# Patient Record
Sex: MALE | Race: WHITE | NOT HISPANIC OR LATINO | Employment: FULL TIME | URBAN - METROPOLITAN AREA
[De-identification: names, ages, dates, MRNs, and addresses within clinical notes are randomized per-mention and may not be internally consistent; named-entity substitution may affect disease eponyms.]

---

## 2023-10-14 ENCOUNTER — LAB (OUTPATIENT)
Dept: LAB | Facility: LAB | Age: 68
End: 2023-10-14
Payer: MEDICARE

## 2023-10-14 DIAGNOSIS — I25.10 ATHEROSCLEROTIC HEART DISEASE OF NATIVE CORONARY ARTERY WITHOUT ANGINA PECTORIS: Primary | ICD-10-CM

## 2023-10-14 LAB
ALBUMIN SERPL BCP-MCNC: 4.4 G/DL (ref 3.4–5)
ALP SERPL-CCNC: 80 U/L (ref 33–136)
ALT SERPL W P-5'-P-CCNC: 22 U/L (ref 10–52)
ANION GAP SERPL CALC-SCNC: 13 MMOL/L (ref 10–20)
AST SERPL W P-5'-P-CCNC: 20 U/L (ref 9–39)
BILIRUB SERPL-MCNC: 1.8 MG/DL (ref 0–1.2)
BUN SERPL-MCNC: 16 MG/DL (ref 6–23)
CALCIUM SERPL-MCNC: 9.2 MG/DL (ref 8.6–10.6)
CHLORIDE SERPL-SCNC: 103 MMOL/L (ref 98–107)
CHOLEST SERPL-MCNC: 155 MG/DL (ref 0–199)
CHOLESTEROL/HDL RATIO: 3.1
CO2 SERPL-SCNC: 26 MMOL/L (ref 21–32)
CREAT SERPL-MCNC: 0.88 MG/DL (ref 0.5–1.3)
ERYTHROCYTE [DISTWIDTH] IN BLOOD BY AUTOMATED COUNT: 12.6 % (ref 11.5–14.5)
GFR SERPL CREATININE-BSD FRML MDRD: >90 ML/MIN/1.73M*2
GLUCOSE SERPL-MCNC: 107 MG/DL (ref 74–99)
HCT VFR BLD AUTO: 46.8 % (ref 41–52)
HDLC SERPL-MCNC: 49.4 MG/DL
HGB BLD-MCNC: 15.6 G/DL (ref 13.5–17.5)
LDLC SERPL CALC-MCNC: 68 MG/DL
MCH RBC QN AUTO: 29.1 PG (ref 26–34)
MCHC RBC AUTO-ENTMCNC: 33.3 G/DL (ref 32–36)
MCV RBC AUTO: 87 FL (ref 80–100)
NON HDL CHOLESTEROL: 106 MG/DL (ref 0–149)
NRBC BLD-RTO: 0 /100 WBCS (ref 0–0)
PLATELET # BLD AUTO: 256 X10*3/UL (ref 150–450)
PMV BLD AUTO: 9.8 FL (ref 7.5–11.5)
POTASSIUM SERPL-SCNC: 4.1 MMOL/L (ref 3.5–5.3)
PROT SERPL-MCNC: 6.9 G/DL (ref 6.4–8.2)
RBC # BLD AUTO: 5.37 X10*6/UL (ref 4.5–5.9)
SODIUM SERPL-SCNC: 138 MMOL/L (ref 136–145)
TRIGL SERPL-MCNC: 188 MG/DL (ref 0–149)
VLDL: 38 MG/DL (ref 0–40)
WBC # BLD AUTO: 6.1 X10*3/UL (ref 4.4–11.3)

## 2023-10-14 PROCEDURE — 80061 LIPID PANEL: CPT

## 2023-10-14 PROCEDURE — 85027 COMPLETE CBC AUTOMATED: CPT

## 2023-10-14 PROCEDURE — 80053 COMPREHEN METABOLIC PANEL: CPT

## 2023-10-14 PROCEDURE — 36415 COLL VENOUS BLD VENIPUNCTURE: CPT

## 2023-10-16 ENCOUNTER — TELEPHONE (OUTPATIENT)
Dept: CARDIOLOGY | Facility: CLINIC | Age: 68
End: 2023-10-16
Payer: MEDICARE

## 2023-10-16 NOTE — TELEPHONE ENCOUNTER
----- Message from Cl Jones MD sent at 10/16/2023 12:41 PM EDT -----  Regarding: labs  Notify pt labs were good including cholesterol much better

## 2023-12-07 DIAGNOSIS — I10 PRIMARY HYPERTENSION: Primary | ICD-10-CM

## 2023-12-07 RX ORDER — AMLODIPINE BESYLATE 5 MG/1
5 TABLET ORAL DAILY
Qty: 90 TABLET | Refills: 0 | Status: SHIPPED | OUTPATIENT
Start: 2023-12-07 | End: 2024-03-06

## 2024-02-16 ENCOUNTER — OFFICE VISIT (OUTPATIENT)
Dept: PRIMARY CARE | Facility: CLINIC | Age: 69
End: 2024-02-16
Payer: MEDICARE

## 2024-02-16 DIAGNOSIS — J34.89 RHINORRHEA: ICD-10-CM

## 2024-02-16 DIAGNOSIS — R09.81 NASAL CONGESTION: Primary | ICD-10-CM

## 2024-02-16 DIAGNOSIS — I10 PRIMARY HYPERTENSION: ICD-10-CM

## 2024-02-16 DIAGNOSIS — E78.5 HYPERLIPIDEMIA, UNSPECIFIED HYPERLIPIDEMIA TYPE: ICD-10-CM

## 2024-02-16 PROCEDURE — 3075F SYST BP GE 130 - 139MM HG: CPT | Performed by: INTERNAL MEDICINE

## 2024-02-16 PROCEDURE — 3078F DIAST BP <80 MM HG: CPT | Performed by: INTERNAL MEDICINE

## 2024-02-16 PROCEDURE — 1126F AMNT PAIN NOTED NONE PRSNT: CPT | Performed by: INTERNAL MEDICINE

## 2024-02-16 PROCEDURE — 99214 OFFICE O/P EST MOD 30 MIN: CPT | Performed by: INTERNAL MEDICINE

## 2024-02-16 RX ORDER — TRIAMCINOLONE ACETONIDE 55 UG/1
2 SPRAY, METERED NASAL DAILY
Qty: 16.5 G | Refills: 11 | Status: SHIPPED | OUTPATIENT
Start: 2024-02-16 | End: 2025-02-15

## 2024-02-16 RX ORDER — CETIRIZINE HYDROCHLORIDE 10 MG/1
10 TABLET ORAL DAILY
Qty: 30 TABLET | Refills: 11 | Status: SHIPPED | OUTPATIENT
Start: 2024-02-16 | End: 2025-02-15

## 2024-02-16 NOTE — PROGRESS NOTES
"Subjective   Patient ID: Pete Zuniga Dds \"CONSTANTINO" is a 68 y.o. male who presents for Follow-up.  HPI  Cough AM phlegm 2 months nasal congestion sudafed   Clears and normal daily shower help  Voice raspy - fever     Review of Systems   Constitutional:  Negative for fever.   HENT:  Negative for dental problem, nosebleeds and sore throat.    Respiratory:  Positive for cough. Negative for chest tightness, shortness of breath and wheezing.    Cardiovascular: Negative.    Gastrointestinal: Negative.  Negative for nausea and vomiting.        No dysphagia       Objective   Physical Exam  Constitutional:       General: He is not in acute distress.     Appearance: Normal appearance.   HENT:      Nose: Congestion present.      Mouth/Throat:      Mouth: Mucous membranes are moist.      Pharynx: No oropharyngeal exudate or posterior oropharyngeal erythema.   Neck:      Comments: No JVD or thyromegaly  Cardiovascular:      Rate and Rhythm: Normal rate and regular rhythm.      Pulses: Normal pulses.      Heart sounds: Normal heart sounds.   Pulmonary:      Effort: Pulmonary effort is normal.      Breath sounds: Normal breath sounds. No wheezing.   Abdominal:      General: Abdomen is flat. Bowel sounds are normal.      Palpations: Abdomen is soft.      Tenderness: There is no abdominal tenderness.   Musculoskeletal:      Cervical back: Normal range of motion and neck supple.      Right lower leg: No edema.      Left lower leg: No edema.   Lymphadenopathy:      Cervical: No cervical adenopathy.   Neurological:      General: No focal deficit present.      Mental Status: He is alert.   Psychiatric:         Mood and Affect: Mood normal.       /74   Pulse 72   Resp 14     Data  Echocardiogram 8/18/2023 -normal  Lab 10/14/2023 reviewed-normal  Cardiology consult 8/18/2023 reviewed    Assessment/Plan     Nasal congestion and rhinorrhea  Reviewed symptomatic relief for both short-term and long-term  Trial antihistamine at night " to enhance sleep and relieve morning mucus  Use topical cortisone nasal spray for 2-3 weeks to clear nasal passages    Hypertension, hyperlipidemia and coronary artery disease stable  Problem List Items Addressed This Visit       Nasal congestion - Primary    Relevant Medications    cetirizine (ZyrTEC) 10 mg tablet    triamcinolone (Nasacort) 55 mcg nasal inhaler    Hyperlipidemia    Primary hypertension    Rhinorrhea    Relevant Medications    triamcinolone (Nasacort) 55 mcg nasal inhaler

## 2024-02-16 NOTE — PATIENT INSTRUCTIONS
Nasal mucus and congestion in morning  Try Zyrtec at bedtime for the next day relief  Nasacort daily for 2 weeks to reduce inflammation and clear the nasal passages    Zyrtec may help the arm rash    Keep in mind RSV and pneumonia vaccines

## 2024-03-06 DIAGNOSIS — I10 PRIMARY HYPERTENSION: ICD-10-CM

## 2024-03-06 RX ORDER — AMLODIPINE BESYLATE 5 MG/1
5 TABLET ORAL DAILY
Qty: 90 TABLET | Refills: 3 | Status: SHIPPED | OUTPATIENT
Start: 2024-03-06

## 2024-03-11 VITALS — SYSTOLIC BLOOD PRESSURE: 130 MMHG | HEART RATE: 72 BPM | DIASTOLIC BLOOD PRESSURE: 74 MMHG | RESPIRATION RATE: 14 BRPM

## 2024-03-11 PROBLEM — R09.81 NASAL CONGESTION: Status: ACTIVE | Noted: 2024-03-11

## 2024-03-11 PROBLEM — J34.89 RHINORRHEA: Status: ACTIVE | Noted: 2024-03-11

## 2024-03-11 PROBLEM — I10 PRIMARY HYPERTENSION: Status: ACTIVE | Noted: 2024-03-11

## 2024-03-11 PROBLEM — E78.5 HYPERLIPIDEMIA: Status: ACTIVE | Noted: 2024-03-11

## 2024-03-11 ASSESSMENT — ENCOUNTER SYMPTOMS
FEVER: 0
CARDIOVASCULAR NEGATIVE: 1
NAUSEA: 0
GASTROINTESTINAL NEGATIVE: 1
CHEST TIGHTNESS: 0
SORE THROAT: 0
WHEEZING: 0
COUGH: 1
ROS GI COMMENTS: NO DYSPHAGIA
VOMITING: 0
SHORTNESS OF BREATH: 0

## 2024-08-07 ENCOUNTER — TELEPHONE (OUTPATIENT)
Dept: PRIMARY CARE | Facility: CLINIC | Age: 69
End: 2024-08-07
Payer: MEDICARE

## 2024-08-07 DIAGNOSIS — U07.1 COVID-19 VIRUS INFECTION: Primary | ICD-10-CM

## 2024-08-07 NOTE — TELEPHONE ENCOUNTER
Patient tested positive Covid yesterday. He would like Paxlovid called into [facility] pharmacy. No shortness of breath as well.

## 2024-08-16 ENCOUNTER — APPOINTMENT (OUTPATIENT)
Dept: CARDIOLOGY | Facility: CLINIC | Age: 69
End: 2024-08-16
Payer: MEDICARE

## 2024-09-06 ENCOUNTER — OFFICE VISIT (OUTPATIENT)
Dept: CARDIOLOGY | Facility: CLINIC | Age: 69
End: 2024-09-06
Payer: MEDICARE

## 2024-09-06 VITALS
WEIGHT: 218.25 LBS | DIASTOLIC BLOOD PRESSURE: 70 MMHG | HEIGHT: 73 IN | SYSTOLIC BLOOD PRESSURE: 132 MMHG | BODY MASS INDEX: 28.93 KG/M2 | OXYGEN SATURATION: 95 % | HEART RATE: 74 BPM

## 2024-09-06 DIAGNOSIS — I25.10 CORONARY ARTERY DISEASE INVOLVING NATIVE CORONARY ARTERY OF NATIVE HEART WITHOUT ANGINA PECTORIS: Primary | ICD-10-CM

## 2024-09-06 PROCEDURE — 3075F SYST BP GE 130 - 139MM HG: CPT | Performed by: INTERNAL MEDICINE

## 2024-09-06 PROCEDURE — 3078F DIAST BP <80 MM HG: CPT | Performed by: INTERNAL MEDICINE

## 2024-09-06 PROCEDURE — 3008F BODY MASS INDEX DOCD: CPT | Performed by: INTERNAL MEDICINE

## 2024-09-06 PROCEDURE — 1159F MED LIST DOCD IN RCRD: CPT | Performed by: INTERNAL MEDICINE

## 2024-09-06 PROCEDURE — 99213 OFFICE O/P EST LOW 20 MIN: CPT | Performed by: INTERNAL MEDICINE

## 2024-09-06 PROCEDURE — 1036F TOBACCO NON-USER: CPT | Performed by: INTERNAL MEDICINE

## 2024-09-06 PROCEDURE — 1126F AMNT PAIN NOTED NONE PRSNT: CPT | Performed by: INTERNAL MEDICINE

## 2024-09-06 ASSESSMENT — ENCOUNTER SYMPTOMS
NAUSEA: 0
LOSS OF SENSATION IN FEET: 0
CHILLS: 0
ALTERED MENTAL STATUS: 0
VOMITING: 0
DEPRESSION: 0
FEVER: 0
WHEEZING: 0
CONSTIPATION: 0
COUGH: 0
OCCASIONAL FEELINGS OF UNSTEADINESS: 0
MEMORY LOSS: 0
ABDOMINAL PAIN: 0
DIARRHEA: 0
HEADACHES: 0
HEMOPTYSIS: 0
HEMATURIA: 0
MYALGIAS: 0
FALLS: 0
BLOATING: 0
DYSURIA: 0

## 2024-09-06 ASSESSMENT — PATIENT HEALTH QUESTIONNAIRE - PHQ9
2. FEELING DOWN, DEPRESSED OR HOPELESS: NOT AT ALL
SUM OF ALL RESPONSES TO PHQ9 QUESTIONS 1 AND 2: 0
1. LITTLE INTEREST OR PLEASURE IN DOING THINGS: NOT AT ALL

## 2024-09-06 ASSESSMENT — COLUMBIA-SUICIDE SEVERITY RATING SCALE - C-SSRS
1. IN THE PAST MONTH, HAVE YOU WISHED YOU WERE DEAD OR WISHED YOU COULD GO TO SLEEP AND NOT WAKE UP?: NO
6. HAVE YOU EVER DONE ANYTHING, STARTED TO DO ANYTHING, OR PREPARED TO DO ANYTHING TO END YOUR LIFE?: NO
2. HAVE YOU ACTUALLY HAD ANY THOUGHTS OF KILLING YOURSELF?: NO

## 2024-09-06 ASSESSMENT — PAIN SCALES - GENERAL: PAINLEVEL: 0-NO PAIN

## 2024-09-06 NOTE — PATIENT INSTRUCTIONS
If swelling is worse/bothers you more, we can try changing Amlodipine to Losartan  [phone] or mychart

## 2024-09-06 NOTE — PROGRESS NOTES
Chief Complaint   Patient presents with    Follow-up    Hyperlipidemia    Hypertension    Coronary Artery Disease      HPI  70 yo WM w/ h/o CAD, HTN, HLD now here for cardiology f/u. He continues to feel good. No chest pain. No dyspnea. No palps. No LH/dizziness/syncope. +mild dep LE edema on Amlodipine (doesn't bother him). No claudication. No cough.   ECG 8/16: SR (65)  ECG 4/21: SR (64)  ECG 8/21: SR (78), normal  Echo 8/23: EF 65%  ROGER 11/16: no CP, 2.5mm ST dep, no WMA, EF 60% -> 75%  ROGER 2/21: no CP 2.5mm ST dep, echo TDS (on my view, EF appears hyperdynamic at rest and stress; borderline DD; no sig pulm HTN)  CCS 9/16: 522  CTA cors 7/21: no LM (separate ostia), oLAD <25%, p-mLAD 50-70%, mLAD 50-70%, dLAD 25-50%, mD2 >70%, mLCx 50-70%, dRCA 25-50%, mod athero of Ao, no aneurysm, mild-mod AV calc     Review of Systems   Constitutional: Negative for chills, fever and malaise/fatigue.   HENT:  Negative for hearing loss.    Eyes:  Negative for visual disturbance.   Respiratory:  Negative for cough, hemoptysis and wheezing.    Skin:  Negative for rash.   Musculoskeletal:  Negative for falls and myalgias.   Gastrointestinal:  Negative for bloating, abdominal pain, constipation, diarrhea, dysphagia, nausea and vomiting.   Genitourinary:  Negative for dysuria and hematuria.   Neurological:  Negative for headaches.   Psychiatric/Behavioral:  Negative for altered mental status, depression and memory loss.       Social History     Tobacco Use   Smoking Status Never   Smokeless Tobacco Never       Family History   Problem Relation Name Age of Onset    Coronary artery disease Father       No Known Allergies     Current Outpatient Medications   Medication Instructions    amLODIPine (NORVASC) 5 mg, oral, Daily    cetirizine (ZYRTEC) 10 mg, oral, Daily    triamcinolone (Nasacort) 55 mcg nasal inhaler 2 sprays, Each Nostril, Daily      Vitals:    09/06/24 1545   BP: 132/70   Pulse: 74   SpO2: 95%      Physical  Exam  Constitutional:       Appearance: Normal appearance.   HENT:      Head: Normocephalic and atraumatic.      Nose: Nose normal.   Neck:      Vascular: No carotid bruit.   Cardiovascular:      Rate and Rhythm: Normal rate and regular rhythm.      Heart sounds: No murmur heard.  Pulmonary:      Effort: Pulmonary effort is normal.      Breath sounds: Normal breath sounds.   Abdominal:      Palpations: Abdomen is soft.      Tenderness: There is no abdominal tenderness.   Musculoskeletal:      Right lower le+ Pitting Edema present.      Left lower le+ Pitting Edema present.   Skin:     General: Skin is warm and dry.   Neurological:      General: No focal deficit present.      Mental Status: He is alert.   Psychiatric:         Mood and Affect: Mood normal.         Judgment: Judgment normal.        Results/Data  10/23 Cr 0.88, K 4.1, LDL 68, HDL 49, , Chol 155, HGB 15.6,    Cr 0.93, K 4.6, LFT nl, , HDL 55, , Chol 194, HGB 15.8, , BNP 8 (occ Rosuva 40)   Cr 0.8   Cr 1.11, K 4.6, LDL 85, HDL 44, , Chol 182, HGB 15.3, , hgba1c 5.8, TSH 3.85 (Rosuva 20)  10/17 , HDL 51, , Chol 209     Assessment/Plan   68 yo WM w/ h/o CAD, HTN, HLD. Doing well. No cardiac symptoms other than ch LE edema likely SE from Amlodipine (which he prefers to not change). Continue medical management. If symptoms, plan cath.  -continue ASA 81 qd (with food)  -continue Amlodipine 5 qd (mild edema doesn't bother him; if does, can change to Losartan or Chlorthalidone)  -continue Rosuva 40 qhs (take daily) -> goal LDL <70  -f/u 1 year (earlier if needed)     Cl Jones MD

## 2025-03-05 DIAGNOSIS — I10 PRIMARY HYPERTENSION: ICD-10-CM

## 2025-03-06 RX ORDER — AMLODIPINE BESYLATE 5 MG/1
5 TABLET ORAL DAILY
Qty: 90 TABLET | Refills: 3 | Status: SHIPPED | OUTPATIENT
Start: 2025-03-06

## 2025-04-04 ENCOUNTER — APPOINTMENT (OUTPATIENT)
Dept: PRIMARY CARE | Facility: CLINIC | Age: 70
End: 2025-04-04
Payer: MEDICARE

## 2025-04-11 ENCOUNTER — APPOINTMENT (OUTPATIENT)
Dept: PRIMARY CARE | Facility: CLINIC | Age: 70
End: 2025-04-11
Payer: MEDICARE

## 2025-04-11 VITALS
DIASTOLIC BLOOD PRESSURE: 80 MMHG | HEIGHT: 73 IN | HEART RATE: 72 BPM | BODY MASS INDEX: 28.79 KG/M2 | SYSTOLIC BLOOD PRESSURE: 142 MMHG | OXYGEN SATURATION: 94 %

## 2025-04-11 DIAGNOSIS — Z12.5 SCREENING FOR PROSTATE CANCER: ICD-10-CM

## 2025-04-11 DIAGNOSIS — I25.10 CORONARY ARTERY DISEASE INVOLVING NATIVE CORONARY ARTERY OF NATIVE HEART WITHOUT ANGINA PECTORIS: ICD-10-CM

## 2025-04-11 DIAGNOSIS — E78.5 HYPERLIPIDEMIA, UNSPECIFIED HYPERLIPIDEMIA TYPE: ICD-10-CM

## 2025-04-11 DIAGNOSIS — Z00.00 ROUTINE GENERAL MEDICAL EXAMINATION AT A HEALTH CARE FACILITY: Primary | ICD-10-CM

## 2025-04-11 DIAGNOSIS — Z11.59 ENCOUNTER FOR HEPATITIS C SCREENING TEST FOR LOW RISK PATIENT: ICD-10-CM

## 2025-04-11 DIAGNOSIS — R73.01 IFG (IMPAIRED FASTING GLUCOSE): ICD-10-CM

## 2025-04-11 DIAGNOSIS — I10 PRIMARY HYPERTENSION: ICD-10-CM

## 2025-04-11 PROCEDURE — 3077F SYST BP >= 140 MM HG: CPT | Performed by: INTERNAL MEDICINE

## 2025-04-11 PROCEDURE — 1159F MED LIST DOCD IN RCRD: CPT | Performed by: INTERNAL MEDICINE

## 2025-04-11 PROCEDURE — 99214 OFFICE O/P EST MOD 30 MIN: CPT | Performed by: INTERNAL MEDICINE

## 2025-04-11 PROCEDURE — 3078F DIAST BP <80 MM HG: CPT | Performed by: INTERNAL MEDICINE

## 2025-04-11 ASSESSMENT — ENCOUNTER SYMPTOMS
DEPRESSION: 0
LOSS OF SENSATION IN FEET: 0
OCCASIONAL FEELINGS OF UNSTEADINESS: 0

## 2025-04-20 ASSESSMENT — ENCOUNTER SYMPTOMS
CONSTITUTIONAL NEGATIVE: 1
CARDIOVASCULAR NEGATIVE: 1
NEUROLOGICAL NEGATIVE: 1
GASTROINTESTINAL NEGATIVE: 1
RESPIRATORY NEGATIVE: 1

## 2025-04-20 NOTE — PROGRESS NOTES
"Subjective   Patient ID: Pete Zuniga Dds \"CONSTANTINO" is a 69 y.o. male who presents for Follow-up.  HPI  No acute complaints  No cardiovascular pulmonary or neurologic symptoms  Immunizations reviewed  [facility] visit 1/8/2021    Review of Systems   Constitutional: Negative.    HENT: Negative.     Respiratory: Negative.     Cardiovascular: Negative.    Gastrointestinal: Negative.    Neurological: Negative.        Objective   Physical Exam  Constitutional:       General: He is not in acute distress.  HENT:      Mouth/Throat:      Pharynx: Oropharynx is clear.   Cardiovascular:      Rate and Rhythm: Normal rate and regular rhythm.      Pulses: Normal pulses.      Heart sounds: Normal heart sounds.   Pulmonary:      Effort: Pulmonary effort is normal.      Breath sounds: Normal breath sounds.   Abdominal:      General: Bowel sounds are normal.      Palpations: Abdomen is soft. There is no mass.      Tenderness: There is no abdominal tenderness.   Neurological:      General: No focal deficit present.      Mental Status: He is alert.      Gait: Gait normal.   Psychiatric:         Mood and Affect: Mood normal.       /80 (BP Location: Right arm, Patient Position: Sitting)   Pulse 72   Ht 1.854 m (6' 1\")   SpO2 94%   BMI 28.79 kg/m²   BP Readings from Last 5 Encounters:   04/11/25 142/80   09/06/24 132/70   02/16/24 130/74   08/19/22 128/72   02/25/22 136/80      Data  Lab 10/14/2023 lipid, CMP, CBC satisfactory  1/5/2021 PSA 0.4  Cardiology consult 8/18/2023 stable on medical therapy  Ophthalmology 11/9/2023    Assessment/Plan     Overall doing well  Coronary artery disease asymptomatic and stable on medical therapy, risk factor modification  General health maintenance measures reviewed  Immunizations reviewed  Problem List Items Addressed This Visit       Hyperlipidemia    Relevant Orders    Renal Function Panel    Lipid Panel    Alanine Aminotransferase    Aspartate Aminotransferase    Hemoglobin A1C    Primary " hypertension    Relevant Orders    Renal Function Panel    Hemoglobin A1C    CBC and Auto Differential    Urinalysis with Reflex Microscopic    Coronary artery disease involving native coronary artery of native heart without angina pectoris    Relevant Orders    Renal Function Panel    CBC and Auto Differential    Encounter for hepatitis C screening test for low risk patient    Relevant Orders    Hepatitis C Antibody    Routine general medical examination at a health care facility - Primary    Relevant Orders    Prostate Specific Antigen, Screen    Hepatitis C Antibody    Renal Function Panel    Lipid Panel    Alanine Aminotransferase    Aspartate Aminotransferase    Hemoglobin A1C    TSH with reflex to Free T4 if abnormal    CBC and Auto Differential    Urinalysis with Reflex Microscopic    IFG (impaired fasting glucose)    Relevant Orders    Hemoglobin A1C

## 2025-05-04 LAB
ALBUMIN SERPL-MCNC: 4.4 G/DL (ref 3.6–5.1)
ALT SERPL-CCNC: 20 U/L (ref 9–46)
APPEARANCE UR: CLEAR
AST SERPL-CCNC: 18 U/L (ref 10–35)
BASOPHILS # BLD AUTO: 78 CELLS/UL (ref 0–200)
BASOPHILS NFR BLD AUTO: 1.4 %
BILIRUB UR QL STRIP: NEGATIVE
BUN SERPL-MCNC: 16 MG/DL (ref 7–25)
BUN/CREAT SERPL: ABNORMAL (CALC) (ref 6–22)
CALCIUM SERPL-MCNC: 8.9 MG/DL (ref 8.6–10.3)
CHLORIDE SERPL-SCNC: 105 MMOL/L (ref 98–110)
CHOLEST SERPL-MCNC: 173 MG/DL
CHOLEST/HDLC SERPL: 3.3 (CALC)
CO2 SERPL-SCNC: 28 MMOL/L (ref 20–32)
COLOR UR: YELLOW
CREAT SERPL-MCNC: 0.93 MG/DL (ref 0.7–1.35)
EGFRCR SERPLBLD CKD-EPI 2021: 89 ML/MIN/1.73M2
EOSINOPHIL # BLD AUTO: 263 CELLS/UL (ref 15–500)
EOSINOPHIL NFR BLD AUTO: 4.7 %
ERYTHROCYTE [DISTWIDTH] IN BLOOD BY AUTOMATED COUNT: 13 % (ref 11–15)
EST. AVERAGE GLUCOSE BLD GHB EST-MCNC: 126 MG/DL
EST. AVERAGE GLUCOSE BLD GHB EST-SCNC: 7 MMOL/L
GLUCOSE SERPL-MCNC: 105 MG/DL (ref 65–99)
GLUCOSE UR QL STRIP: NEGATIVE
HBA1C MFR BLD: 6 %
HCT VFR BLD AUTO: 46.7 % (ref 38.5–50)
HCV AB SERPL QL IA: NORMAL
HDLC SERPL-MCNC: 52 MG/DL
HGB BLD-MCNC: 16.1 G/DL (ref 13.2–17.1)
HGB UR QL STRIP: NEGATIVE
KETONES UR QL STRIP: NEGATIVE
LDLC SERPL CALC-MCNC: 92 MG/DL (CALC)
LEUKOCYTE ESTERASE UR QL STRIP: NEGATIVE
LYMPHOCYTES # BLD AUTO: 1613 CELLS/UL (ref 850–3900)
LYMPHOCYTES NFR BLD AUTO: 28.8 %
MCH RBC QN AUTO: 30 PG (ref 27–33)
MCHC RBC AUTO-ENTMCNC: 34.5 G/DL (ref 32–36)
MCV RBC AUTO: 87 FL (ref 80–100)
MONOCYTES # BLD AUTO: 666 CELLS/UL (ref 200–950)
MONOCYTES NFR BLD AUTO: 11.9 %
NEUTROPHILS # BLD AUTO: 2979 CELLS/UL (ref 1500–7800)
NEUTROPHILS NFR BLD AUTO: 53.2 %
NITRITE UR QL STRIP: NEGATIVE
NONHDLC SERPL-MCNC: 121 MG/DL (CALC)
PH UR STRIP: NORMAL [PH] (ref 5–8)
PHOSPHATE SERPL-MCNC: 2.8 MG/DL (ref 2.1–4.3)
PLATELET # BLD AUTO: 245 THOUSAND/UL (ref 140–400)
PMV BLD REES-ECKER: 9.9 FL (ref 7.5–12.5)
POTASSIUM SERPL-SCNC: 4.2 MMOL/L (ref 3.5–5.3)
PROT UR QL STRIP: NEGATIVE
PSA SERPL-MCNC: 0.57 NG/ML
RBC # BLD AUTO: 5.37 MILLION/UL (ref 4.2–5.8)
SODIUM SERPL-SCNC: 141 MMOL/L (ref 135–146)
SP GR UR STRIP: 1.02 (ref 1–1.03)
TRIGL SERPL-MCNC: 196 MG/DL
TSH SERPL-ACNC: 2.41 MIU/L (ref 0.4–4.5)
WBC # BLD AUTO: 5.6 THOUSAND/UL (ref 3.8–10.8)

## 2025-05-23 ENCOUNTER — APPOINTMENT (OUTPATIENT)
Dept: PRIMARY CARE | Facility: CLINIC | Age: 70
End: 2025-05-23
Payer: MEDICARE

## 2025-05-23 VITALS
SYSTOLIC BLOOD PRESSURE: 124 MMHG | HEIGHT: 72 IN | HEART RATE: 64 BPM | DIASTOLIC BLOOD PRESSURE: 80 MMHG | WEIGHT: 208 LBS | RESPIRATION RATE: 16 BRPM | OXYGEN SATURATION: 94 % | BODY MASS INDEX: 28.17 KG/M2

## 2025-05-23 DIAGNOSIS — R73.03 PREDIABETES: ICD-10-CM

## 2025-05-23 DIAGNOSIS — Z00.00 ROUTINE GENERAL MEDICAL EXAMINATION AT HEALTH CARE FACILITY: ICD-10-CM

## 2025-05-23 DIAGNOSIS — E78.5 HYPERLIPIDEMIA, UNSPECIFIED HYPERLIPIDEMIA TYPE: ICD-10-CM

## 2025-05-23 DIAGNOSIS — R60.0 LOCALIZED EDEMA: ICD-10-CM

## 2025-05-23 DIAGNOSIS — Z11.59 ENCOUNTER FOR HEPATITIS C SCREENING TEST FOR LOW RISK PATIENT: ICD-10-CM

## 2025-05-23 DIAGNOSIS — I25.10 CORONARY ARTERY DISEASE INVOLVING NATIVE CORONARY ARTERY OF NATIVE HEART WITHOUT ANGINA PECTORIS: ICD-10-CM

## 2025-05-23 DIAGNOSIS — I10 PRIMARY HYPERTENSION: ICD-10-CM

## 2025-05-23 DIAGNOSIS — D69.6 THROMBOCYTOPENIA: ICD-10-CM

## 2025-05-23 DIAGNOSIS — Z00.00 ROUTINE GENERAL MEDICAL EXAMINATION AT A HEALTH CARE FACILITY: Primary | ICD-10-CM

## 2025-05-23 DIAGNOSIS — R73.01 IFG (IMPAIRED FASTING GLUCOSE): ICD-10-CM

## 2025-05-23 NOTE — PATIENT INSTRUCTIONS
Overall you are in good health today, age and gender appropriate wellness services reviewed  You have no clinical evidence of active heart disease, cardiac risk factors include age, high cholesterol, blood pressure and family history  Electrocardiogram normal  Consider stress echocardiogram this year  Coronary artery disease on medical therapy stable  Age and gender appropriate cancer screening (up-to-date, colonoscopy next 2026  Immunizations up-to-date; flu shot in the fall and tetanus shot next year  Mild earwax

## 2025-05-23 NOTE — PROGRESS NOTES
"Subjective   Patient ID: Pete Zuniga Dds \"CONSTANTINO" is a 69 y.o. male who presents for Annual Exam.  History of Present Illness  Pete Zuniga Dds \"CONSTANTINO" is a 69 year old male who presents for an annual physical exam.    He feels well overall with no specific health concerns or complaints. His weight is stable at approximately 210 pounds, with no significant fluctuations. He experiences morning drainage causing temporary hoarseness, which resolves on its own.    He has had recent dental and eye exams with no reported issues. No chest pain, heart palpitations, dizziness, abdominal pain, changes in appetite, bowel or bladder habits, or musculoskeletal issues. No neurological symptoms such as weakness, numbness, or tremors.    He maintains a regular exercise routine, rowing a couple of times a week, and primarily consumes tea for caffeine. He does not use tobacco and wears seatbelts regularly.    His current medications include amlodipine 5 mg daily for blood pressure, rosuvastatin for cholesterol, and Zyrtec as needed for allergies. A cardiologist previously recommended he take a low-dose aspirin, but he has not been consistent due to bleeding concerns.    Family history is notable for  parents and two brothers, two sisters, and two sons who are all reportedly in good health.    Review of Systems   Constitutional: Negative.  Negative for fatigue, fever and unexpected weight change.   HENT: Negative.  Negative for dental problem, hearing loss, sore throat, tinnitus, trouble swallowing and voice change.    Eyes: Negative.  Negative for visual disturbance.   Respiratory: Negative.  Negative for apnea, cough and shortness of breath.    Cardiovascular: Negative.  Negative for chest pain, palpitations and leg swelling.   Gastrointestinal: Negative.  Negative for abdominal pain, constipation, diarrhea, nausea and vomiting.   Endocrine: Negative.  Negative for polydipsia, polyphagia and polyuria.   Genitourinary: " Negative.  Negative for dysuria, frequency, hematuria and urgency.   Musculoskeletal: Negative.  Negative for arthralgias, back pain and gait problem.   Skin: Negative.  Negative for rash and wound.   Allergic/Immunologic: Negative for immunocompromised state.   Neurological: Negative.  Negative for dizziness, tremors, speech difficulty, weakness, light-headedness, numbness and headaches.   Hematological:  Does not bruise/bleed easily.   Psychiatric/Behavioral: Negative.  Negative for confusion, decreased concentration, dysphoric mood and sleep disturbance. The patient is not nervous/anxious.      ROS otherwise negative aside from what was mentioned above in HPI.    Objective     /80   Pulse 64   Resp 16   Ht 1.829 m (6')   Wt 94.3 kg (208 lb)   SpO2 94%   BMI 28.21 kg/m²      Current Outpatient Medications   Medication Instructions    amLODIPine (NORVASC) 5 mg, oral, Daily    cetirizine (ZYRTEC) 10 mg, oral, Daily    rosuvastatin (Crestor) 40 mg tablet 1 tablet, Daily       Physical Exam  Physical Exam    General: well-developed, well-nourished middle-aged ambulatory white male in no acute distress  Head: normocephalic/atraumatic; temporal arteries intact, nontender; Temporalmandibular joints nontender without click  Eyes: EOMI, PERRLA, sclera white, conjunctiva pink, fundi not examined  Ears: Canals clear left, partially occluded with cerumen on right, clear after disimpaction with curette under direct vision, well-tolerated ;TMs clear intact with normal landmarks, hearing intact to speech and finger rub after disimpaction  Nose: Nasal mucosa clear, pink, moist; no sinus tenderness  Oropharynx: Mucosa clear, pink, moist without lesions or exudate            Dentition intact good/ fair condition/repair            Tonsils atrophy/absent            Tongue midline, normal mucosa and protrusion            Normal motion of palate/uvula/pharynx  Neck: Supple, full range of motion; no lymphadenopathy; no JVD             Thyroid normal size, smooth to palpation/observation without nodules            Carotids normal pulse, upstroke without bruit  Back: Spine normal shape, curvature, flexibility, nontender            No CVA or SIJ tenderness  Chest: Normal symmetrical, full expansion with equal breath sounds without adventitious sounds; wall nontender  Cor: PMI normal; regular rate and rhythm; normal S1, S2 without adventitious sounds  Abdomen: Soft, nontender, benign without mass, hepatosplenomegaly, fluid            Bowels sounds present, normal  Genitourinary: Normal male phallus circumcised without lesion or exudate            Scrotum without masses; testes smooth, nontender            Inguinal canal without mass  Rectal: Normal sphincter tone; no mass; stool formed, soft, brown, hemoccult negative            Prostate smooth, normal size, nontender, normal consistency  Extremities: Bones, Joints intact with FROM, T0 L0 S0            Pulses intact, symmetrical; minimal ankle edema; venous system legs normal  Neurological: Mental status - alert, appropriate affect, normal orientation and conversational interaction;RIGHT handed dominant            Cranial Nerves: II-XII intact            Motor - 5/5 strength throughout muscle groups; normal tone, bulk            Sensory - intact symmetrical soft touch, sharp sense            DTRs - intact knee, ankle, brachial            Cerebellar - normal finger-nose; absent Rhomberg sign            Station/gait - intact, stable including toe, heel, and tandem   Integument: Skin - clear without rash or lesion            Lymph - without cervical, axillary, or inguinal lymphadenopathy     Results  LABS  Urinalysis: No hematuria, glycosuria, or proteinuria (05/03/2025)  CBC: Normal WBC, RBC, PLT (05/03/2025)  TSH: Normal (05/03/2025)  HbA1c: 6.0% (05/03/2025)  LFTs: Normal (05/03/2025)  Triglycerides: 196 mg/dL (05/03/2025)  Total Cholesterol: <200 mg/dL (05/03/2025)  HDL: 52 mg/dL  (05/03/2025)  LDL: 92 mg/dL (05/03/2025)  Cholesterol Ratio: 3.3 (05/03/2025)  Fasting Blood Glucose: 126 mg/dL (05/03/2025)  Renal Function Tests: Normal (05/03/2025)  Hepatitis C Antibody: Negative (05/03/2025)  PSA: 0.6 ng/mL (05/03/2025)  Colonoscopy 11/4/16  ECG 5/23/2025 sinus bradycardia  Echocardiogram 8/15/2023  CT cardiac heart flow study 8/31/2021  Nuclear medicine stress test 11/15/2016  Assessment & Plan  Coronary artery disease without angina  Coronary artery disease is well-managed without angina. Cardiologist recommended aspirin therapy to reduce platelet aggregation and prevent myocardial infarction.  - Consider low-dose aspirin (81 mg) on Monday, Wednesday, and Friday.    Primary hypertension  Hypertension is well-controlled with amlodipine 5 mg daily.    Hyperlipidemia  Lipid levels are well-controlled with rosuvastatin 40 mg daily. Total cholesterol is under 200, LDL is 92, HDL is 52, triglycerides are 196. Cholesterol ratio is 3.3, indicating less than average cardiovascular risk.    Prediabetes  Hemoglobin A1c is 6.0, indicating prediabetes. Fasting blood glucose is 126.  - Encourage weight loss and dietary modifications, focusing on reducing simple sugars and carbohydrates.    Overall you are in good health today, age and gender appropriate wellness services reviewed  You have no clinical evidence of active heart disease, cardiac risk factors include age, high cholesterol, blood pressure and family history  Electrocardiogram normal  Consider stress echocardiogram this year  Coronary artery disease on medical therapy stable  Age and gender appropriate cancer screening (up-to-date, colonoscopy next 2026  Immunizations up-to-date; flu shot in the fall and tetanus shot next year  Mild earwax      Brodie Cardenas MD     This medical note was created with the assistance of artificial intelligence (AI) for documentation purposes. The content has been reviewed and confirmed by the healthcare provider  for accuracy and completeness. Patient consented to the use of audio recording and use of AI during their visit.

## 2025-06-01 PROBLEM — H25.011 CORTICAL AGE-RELATED CATARACT, RIGHT EYE: Status: ACTIVE | Noted: 2023-09-27

## 2025-06-01 PROBLEM — S39.012A LUMBAR STRAIN: Status: RESOLVED | Noted: 2025-06-01 | Resolved: 2025-06-01

## 2025-06-01 PROBLEM — H61.20 EXCESSIVE CERUMEN IN EAR CANAL: Status: ACTIVE | Noted: 2025-06-01

## 2025-06-01 PROBLEM — S29.012A STRAIN OF LATISSIMUS DORSI MUSCLE: Status: RESOLVED | Noted: 2025-06-01 | Resolved: 2025-06-01

## 2025-06-01 PROBLEM — R93.1 AGATSTON CORONARY ARTERY CALCIUM SCORE GREATER THAN 400: Status: RESOLVED | Noted: 2025-06-01 | Resolved: 2025-06-01

## 2025-06-01 PROBLEM — S29.019A THORACIC MYOFASCIAL STRAIN: Status: RESOLVED | Noted: 2025-06-01 | Resolved: 2025-06-01

## 2025-06-01 PROBLEM — E55.9 VITAMIN D DEFICIENCY: Status: ACTIVE | Noted: 2025-06-01

## 2025-06-01 PROBLEM — J02.9 ACUTE PHARYNGITIS: Status: RESOLVED | Noted: 2025-06-01 | Resolved: 2025-06-01

## 2025-06-01 PROBLEM — R73.03 PREDIABETES: Status: ACTIVE | Noted: 2025-06-01

## 2025-06-01 PROBLEM — J11.1 INFLUENZA-LIKE SYNDROME: Status: RESOLVED | Noted: 2025-06-01 | Resolved: 2025-06-01

## 2025-06-01 PROBLEM — H61.21 IMPACTED CERUMEN OF RIGHT EAR: Status: RESOLVED | Noted: 2025-06-01 | Resolved: 2025-06-01

## 2025-06-01 PROBLEM — H43.812 PVD (POSTERIOR VITREOUS DETACHMENT), LEFT EYE: Status: ACTIVE | Noted: 2023-09-27

## 2025-06-01 PROBLEM — R43.0 LOSS OF SENSE OF SMELL: Status: RESOLVED | Noted: 2025-06-01 | Resolved: 2025-06-01

## 2025-06-01 PROBLEM — D69.6 THROMBOCYTOPENIA: Status: ACTIVE | Noted: 2025-06-01

## 2025-06-01 PROBLEM — J31.0 NON-ALLERGIC RHINITIS: Status: RESOLVED | Noted: 2025-06-01 | Resolved: 2025-06-01

## 2025-06-01 PROBLEM — D72.819 LEUKOPENIA: Status: RESOLVED | Noted: 2025-06-01 | Resolved: 2025-06-01

## 2025-06-01 PROBLEM — Z77.29 EXPOSURE TO POTENTIALLY HAZARDOUS SUBSTANCE: Status: RESOLVED | Noted: 2025-06-01 | Resolved: 2025-06-01

## 2025-06-01 PROBLEM — M25.522 LEFT ELBOW PAIN: Status: RESOLVED | Noted: 2025-06-01 | Resolved: 2025-06-01

## 2025-06-01 PROBLEM — R60.9 EDEMA: Status: ACTIVE | Noted: 2025-06-01

## 2025-06-01 PROBLEM — R68.89 FLU-LIKE SYMPTOMS: Status: RESOLVED | Noted: 2025-06-01 | Resolved: 2025-06-01

## 2025-06-01 RX ORDER — ROSUVASTATIN CALCIUM 40 MG/1
1 TABLET, COATED ORAL DAILY
COMMUNITY
Start: 2016-09-16

## 2025-06-01 ASSESSMENT — ENCOUNTER SYMPTOMS
LIGHT-HEADEDNESS: 0
APNEA: 0
POLYDIPSIA: 0
SPEECH DIFFICULTY: 0
TREMORS: 0
DYSPHORIC MOOD: 0
VOICE CHANGE: 0
RESPIRATORY NEGATIVE: 1
NUMBNESS: 0
POLYPHAGIA: 0
WEAKNESS: 0
BACK PAIN: 0
ARTHRALGIAS: 0
NAUSEA: 0
SORE THROAT: 0
SHORTNESS OF BREATH: 0
TROUBLE SWALLOWING: 0
FATIGUE: 0
BRUISES/BLEEDS EASILY: 0
NEUROLOGICAL NEGATIVE: 1
CONSTIPATION: 0
DYSURIA: 0
ABDOMINAL PAIN: 0
DIZZINESS: 0
PSYCHIATRIC NEGATIVE: 1
HEADACHES: 0
ENDOCRINE NEGATIVE: 1
UNEXPECTED WEIGHT CHANGE: 0
CONFUSION: 0
WOUND: 0
NERVOUS/ANXIOUS: 0
PALPITATIONS: 0
MUSCULOSKELETAL NEGATIVE: 1
COUGH: 0
SLEEP DISTURBANCE: 0
CARDIOVASCULAR NEGATIVE: 1
DIARRHEA: 0
HEMATURIA: 0
GASTROINTESTINAL NEGATIVE: 1
CONSTITUTIONAL NEGATIVE: 1
VOMITING: 0
EYES NEGATIVE: 1
FREQUENCY: 0
DECREASED CONCENTRATION: 0
FEVER: 0

## 2025-06-01 ASSESSMENT — ACTIVITIES OF DAILY LIVING (ADL)
TAKING_MEDICATION: INDEPENDENT
DOING_HOUSEWORK: INDEPENDENT
DRESSING: INDEPENDENT
BATHING: INDEPENDENT
MANAGING_FINANCES: INDEPENDENT
GROCERY_SHOPPING: INDEPENDENT

## 2025-06-01 ASSESSMENT — PATIENT HEALTH QUESTIONNAIRE - PHQ9
1. LITTLE INTEREST OR PLEASURE IN DOING THINGS: NOT AT ALL
SUM OF ALL RESPONSES TO PHQ9 QUESTIONS 1 AND 2: 0
2. FEELING DOWN, DEPRESSED OR HOPELESS: NOT AT ALL

## 2025-08-08 ENCOUNTER — TELEPHONE (OUTPATIENT)
Dept: PRIMARY CARE | Facility: CLINIC | Age: 70
End: 2025-08-08
Payer: MEDICARE

## 2025-08-08 DIAGNOSIS — E78.5 HYPERLIPIDEMIA, UNSPECIFIED HYPERLIPIDEMIA TYPE: Primary | ICD-10-CM

## 2025-08-08 RX ORDER — ROSUVASTATIN CALCIUM 40 MG/1
40 TABLET, COATED ORAL DAILY
Qty: 90 TABLET | Refills: 3 | Status: SHIPPED | OUTPATIENT
Start: 2025-08-08

## 2026-05-29 ENCOUNTER — APPOINTMENT (OUTPATIENT)
Dept: PRIMARY CARE | Facility: CLINIC | Age: 71
End: 2026-05-29
Payer: MEDICARE